# Patient Record
Sex: FEMALE
[De-identification: names, ages, dates, MRNs, and addresses within clinical notes are randomized per-mention and may not be internally consistent; named-entity substitution may affect disease eponyms.]

---

## 2018-01-02 ENCOUNTER — HOSPITAL ENCOUNTER (EMERGENCY)
Dept: HOSPITAL 14 - H.ER | Age: 38
Discharge: HOME | End: 2018-01-02
Payer: COMMERCIAL

## 2018-01-02 VITALS
HEART RATE: 71 BPM | RESPIRATION RATE: 20 BRPM | DIASTOLIC BLOOD PRESSURE: 62 MMHG | TEMPERATURE: 97.4 F | OXYGEN SATURATION: 96 % | SYSTOLIC BLOOD PRESSURE: 118 MMHG

## 2018-01-02 DIAGNOSIS — Z87.891: ICD-10-CM

## 2018-01-02 DIAGNOSIS — S99.912A: Primary | ICD-10-CM
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
## 2018-01-02 NOTE — RAD
PROCEDURE:  Left Knee Radiographs.



HISTORY:

Pain.



COMPARISON:

None.



FINDINGS:



BONES:

No acute fracture. No lytic or blastic osseous lesion. 



JOINTS:

Normal. No osteoarthritis. 



JOINT EFFUSION:

None. 



OTHER FINDINGS:

None.



IMPRESSION:

Normal radiographs of the left knee.
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
